# Patient Record
Sex: FEMALE | Race: WHITE
[De-identification: names, ages, dates, MRNs, and addresses within clinical notes are randomized per-mention and may not be internally consistent; named-entity substitution may affect disease eponyms.]

---

## 2018-03-26 ENCOUNTER — HOSPITAL ENCOUNTER (EMERGENCY)
Dept: HOSPITAL 17 - PHED | Age: 28
Discharge: HOME | End: 2018-03-26
Payer: SELF-PAY

## 2018-03-26 VITALS — TEMPERATURE: 98.9 F | HEART RATE: 93 BPM | OXYGEN SATURATION: 98 % | RESPIRATION RATE: 18 BRPM

## 2018-03-26 VITALS — HEART RATE: 89 BPM | RESPIRATION RATE: 16 BRPM | OXYGEN SATURATION: 98 %

## 2018-03-26 VITALS — HEIGHT: 63 IN | WEIGHT: 196.65 LBS | BODY MASS INDEX: 34.84 KG/M2

## 2018-03-26 VITALS
RESPIRATION RATE: 18 BRPM | DIASTOLIC BLOOD PRESSURE: 81 MMHG | OXYGEN SATURATION: 96 % | TEMPERATURE: 99.1 F | SYSTOLIC BLOOD PRESSURE: 140 MMHG | HEART RATE: 121 BPM

## 2018-03-26 DIAGNOSIS — J10.1: Primary | ICD-10-CM

## 2018-03-26 DIAGNOSIS — F17.200: ICD-10-CM

## 2018-03-26 LAB
ALBUMIN SERPL-MCNC: 3.3 GM/DL (ref 3.4–5)
ALP SERPL-CCNC: 65 U/L (ref 45–117)
ALT SERPL-CCNC: 50 U/L (ref 10–53)
AST SERPL-CCNC: 23 U/L (ref 15–37)
BASOPHILS # BLD AUTO: 0.2 TH/MM3 (ref 0–0.2)
BASOPHILS NFR BLD: 2.8 % (ref 0–2)
BILIRUB SERPL-MCNC: 0.7 MG/DL (ref 0.2–1)
BUN SERPL-MCNC: 18 MG/DL (ref 7–18)
CALCIUM SERPL-MCNC: 8.2 MG/DL (ref 8.5–10.1)
CHLORIDE SERPL-SCNC: 105 MEQ/L (ref 98–107)
CREAT SERPL-MCNC: 0.74 MG/DL (ref 0.5–1)
EOSINOPHIL # BLD: 0 TH/MM3 (ref 0–0.4)
EOSINOPHIL NFR BLD: 0.6 % (ref 0–4)
ERYTHROCYTE [DISTWIDTH] IN BLOOD BY AUTOMATED COUNT: 12.1 % (ref 11.6–17.2)
GFR SERPLBLD BASED ON 1.73 SQ M-ARVRAT: 96 ML/MIN (ref 89–?)
GLUCOSE SERPL-MCNC: 102 MG/DL (ref 74–106)
HCO3 BLD-SCNC: 24.8 MEQ/L (ref 21–32)
HCT VFR BLD CALC: 44 % (ref 35–46)
HGB BLD-MCNC: 14.7 GM/DL (ref 11.6–15.3)
LYMPHOCYTES # BLD AUTO: 0.6 TH/MM3 (ref 1–4.8)
LYMPHOCYTES NFR BLD AUTO: 8.6 % (ref 9–44)
MCH RBC QN AUTO: 28.7 PG (ref 27–34)
MCHC RBC AUTO-ENTMCNC: 33.5 % (ref 32–36)
MCV RBC AUTO: 85.8 FL (ref 80–100)
MONOCYTE #: 0.5 TH/MM3 (ref 0–0.9)
MONOCYTES NFR BLD: 7 % (ref 0–8)
NEUTROPHILS # BLD AUTO: 5.9 TH/MM3 (ref 1.8–7.7)
NEUTROPHILS NFR BLD AUTO: 81 % (ref 16–70)
PLATELET # BLD: 198 TH/MM3 (ref 150–450)
PMV BLD AUTO: 8.8 FL (ref 7–11)
PROT SERPL-MCNC: 6.9 GM/DL (ref 6.4–8.2)
RBC # BLD AUTO: 5.13 MIL/MM3 (ref 4–5.3)
SODIUM SERPL-SCNC: 139 MEQ/L (ref 136–145)
WBC # BLD AUTO: 7.2 TH/MM3 (ref 4–11)

## 2018-03-26 PROCEDURE — 96375 TX/PRO/DX INJ NEW DRUG ADDON: CPT

## 2018-03-26 PROCEDURE — 96361 HYDRATE IV INFUSION ADD-ON: CPT

## 2018-03-26 PROCEDURE — 96372 THER/PROPH/DIAG INJ SC/IM: CPT

## 2018-03-26 PROCEDURE — 99284 EMERGENCY DEPT VISIT MOD MDM: CPT

## 2018-03-26 PROCEDURE — 85025 COMPLETE CBC W/AUTO DIFF WBC: CPT

## 2018-03-26 PROCEDURE — 96374 THER/PROPH/DIAG INJ IV PUSH: CPT

## 2018-03-26 PROCEDURE — 87804 INFLUENZA ASSAY W/OPTIC: CPT

## 2018-03-26 PROCEDURE — 80053 COMPREHEN METABOLIC PANEL: CPT

## 2018-03-26 NOTE — PD
HPI


Chief Complaint:  GI Complaint


Time Seen by Provider:  01:02


Travel History


International Travel<30 days:  No


Contact w/Intl Traveler<30days:  No


Traveled to known affect area:  No





History of Present Illness


HPI


The patient is a 27-year-old female who presents to the emergency department 

for nausea, vomiting, diarrhea, and body aches.  The patient's symptoms started 

this morning with nausea and vomiting.  She has been unable to tolerate any 

oral intake today secondary to persistent nausea and vomiting.  She then 

developed diarrhea which she describes as loose, watery, brown, without any 

visible blood.  She also complains of generalized malaise and body aches.  She 

denies any sick contacts at home.  She does complain of subjective fevers at 

home but denies any chills or sweats.  She denies any associated dysuria, 

frequency, or urgency.  Last menstrual cycle was 2 weeks ago, she denies 

pregnancy.





PFSH


Past Medical History


Genitourinary:  Yes (UTI)


Pregnant?:  Not Pregnant


LMP:  03/12/18





Past Surgical History


Tonsillectomy:  Yes





Social History


Alcohol Use:  No


Tobacco Use:  Yes (1/2 PPD)


Substance Use:  No





Allergies-Medications


(Allergen,Severity, Reaction):  


Coded Allergies:  


     peanut (Verified  Allergy, Severe, Swelling, 3/26/18)


 "THROAT SWELLING AND HIVES"


Reported Meds & Prescriptions





Reported Meds & Active Scripts


Active


No Active Prescriptions or Reported Medications    








Review of Systems


Except as stated in HPI:  all other systems reviewed are Neg


General / Constitutional:  Positive: Fever (Subjective)


HENT:  No: Lightheadedness


Cardiovascular:  No: Chest Pain or Discomfort


Respiratory:  No: Shortness of Breath


Gastrointestinal:  Positive: Nausea, Vomiting, Diarrhea, Abdominal Pain (

Cramping)


Genitourinary:  No: Dysuria


Musculoskeletal:  Positive: Myalgias





Physical Exam


Narrative


GENERAL: Awake, alert, pleasant 27-year-old female who appears her stated age 

and is in no acute respiratory distress.


SKIN: Focused skin assessment warm/dry.


HEAD: Atraumatic. Normocephalic. 


EYES: No injection or drainage.


ENT: No nasal bleeding or discharge.  Slightly dry mucous membranes.


NECK: Trachea midline. No JVD. 


CARDIOVASCULAR: Regular, tachycardic with a heart rate 115.


RESPIRATORY: No accessory muscle use. Clear to auscultation. Breath sounds 

equal bilaterally. 


GASTROINTESTINAL: Abdomen soft, minimal epigastric tenderness.  No rebound 

tenderness, guarding, rigidity.


Back: No CVA tenderness.


MUSCULOSKELETAL: No obvious deformities. No clubbing.  No cyanosis.  No edema. 


NEUROLOGICAL: Awake and alert. No obvious cranial nerve deficits.  Motor 

grossly within normal limits. Normal speech.


PSYCHIATRIC: Appropriate mood and affect; insight and judgment normal.





Data


Data


Last Documented VS





Vital Signs








  Date Time  Temp Pulse Resp B/P (MAP) Pulse Ox O2 Delivery O2 Flow Rate FiO2


 


3/26/18 02:12 98.9 93 18  98 Room Air  








Orders





 Orders


Complete Blood Count With Diff (3/26/18 01:05)


Comprehensive Metabolic Panel (3/26/18 01:05)


Iv Access Insert/Monitor (3/26/18 01:05)


Ecg Monitoring (3/26/18 01:05)


Oximetry (3/26/18 01:05)


Ondansetron Inj (Zofran Inj) (3/26/18 01:15)


Sodium Chlor 0.9% 1000 Ml Inj (Ns 1000 M (3/26/18 01:05)


Sodium Chloride 0.9% Flush (Ns Flush) (3/26/18 01:15)


Famotidine Inj (Pepcid Inj) (3/26/18 01:15)


Dicyclomine Inj (Bentyl Inj) (3/26/18 01:15)


Influenzae A/B Antigen (3/26/18 01:15)





Labs





Laboratory Tests








Test


  3/26/18


01:15


 


White Blood Count 7.2 TH/MM3 


 


Red Blood Count 5.13 MIL/MM3 


 


Hemoglobin 14.7 GM/DL 


 


Hematocrit 44.0 % 


 


Mean Corpuscular Volume 85.8 FL 


 


Mean Corpuscular Hemoglobin 28.7 PG 


 


Mean Corpuscular Hemoglobin


Concent 33.5 % 


 


 


Red Cell Distribution Width 12.1 % 


 


Platelet Count 198 TH/MM3 


 


Mean Platelet Volume 8.8 FL 


 


Neutrophils (%) (Auto) 81.0 % 


 


Lymphocytes (%) (Auto) 8.6 % 


 


Monocytes (%) (Auto) 7.0 % 


 


Eosinophils (%) (Auto) 0.6 % 


 


Basophils (%) (Auto) 2.8 % 


 


Neutrophils # (Auto) 5.9 TH/MM3 


 


Lymphocytes # (Auto) 0.6 TH/MM3 


 


Monocytes # (Auto) 0.5 TH/MM3 


 


Eosinophils # (Auto) 0.0 TH/MM3 


 


Basophils # (Auto) 0.2 TH/MM3 


 


CBC Comment DIFF FINAL 


 


Differential Comment  


 


Blood Urea Nitrogen 18 MG/DL 


 


Creatinine 0.74 MG/DL 


 


Random Glucose 102 MG/DL 


 


Total Protein 6.9 GM/DL 


 


Albumin 3.3 GM/DL 


 


Calcium Level 8.2 MG/DL 


 


Alkaline Phosphatase 65 U/L 


 


Aspartate Amino Transf


(AST/SGOT) 23 U/L 


 


 


Alanine Aminotransferase


(ALT/SGPT) 50 U/L 


 


 


Total Bilirubin 0.7 MG/DL 


 


Sodium Level 139 MEQ/L 


 


Potassium Level 3.7 MEQ/L 


 


Chloride Level 105 MEQ/L 


 


Carbon Dioxide Level 24.8 MEQ/L 


 


Anion Gap 9 MEQ/L 


 


Estimat Glomerular Filtration


Rate 96 ML/MIN 


 











MDM


Medical Decision Making


Medical Screen Exam Complete:  Yes


Emergency Medical Condition:  Yes


Medical Record Reviewed:  Yes


Interpretation(s)





Laboratory Tests








Test


  3/26/18


01:15


 


White Blood Count 7.2 TH/MM3 


 


Red Blood Count 5.13 MIL/MM3 


 


Hemoglobin 14.7 GM/DL 


 


Hematocrit 44.0 % 


 


Mean Corpuscular Volume 85.8 FL 


 


Mean Corpuscular Hemoglobin 28.7 PG 


 


Mean Corpuscular Hemoglobin


Concent 33.5 % 


 


 


Red Cell Distribution Width 12.1 % 


 


Platelet Count 198 TH/MM3 


 


Mean Platelet Volume 8.8 FL 


 


Neutrophils (%) (Auto) 81.0 % 


 


Lymphocytes (%) (Auto) 8.6 % 


 


Monocytes (%) (Auto) 7.0 % 


 


Eosinophils (%) (Auto) 0.6 % 


 


Basophils (%) (Auto) 2.8 % 


 


Neutrophils # (Auto) 5.9 TH/MM3 


 


Lymphocytes # (Auto) 0.6 TH/MM3 


 


Monocytes # (Auto) 0.5 TH/MM3 


 


Eosinophils # (Auto) 0.0 TH/MM3 


 


Basophils # (Auto) 0.2 TH/MM3 


 


CBC Comment DIFF FINAL 


 


Differential Comment  


 


Blood Urea Nitrogen 18 MG/DL 


 


Creatinine 0.74 MG/DL 


 


Random Glucose 102 MG/DL 


 


Total Protein 6.9 GM/DL 


 


Albumin 3.3 GM/DL 


 


Calcium Level 8.2 MG/DL 


 


Alkaline Phosphatase 65 U/L 


 


Aspartate Amino Transf


(AST/SGOT) 23 U/L 


 


 


Alanine Aminotransferase


(ALT/SGPT) 50 U/L 


 


 


Total Bilirubin 0.7 MG/DL 


 


Sodium Level 139 MEQ/L 


 


Potassium Level 3.7 MEQ/L 


 


Chloride Level 105 MEQ/L 


 


Carbon Dioxide Level 24.8 MEQ/L 


 


Anion Gap 9 MEQ/L 


 


Estimat Glomerular Filtration


Rate 96 ML/MIN 


 














 Date/Time


Source Procedure


Growth Status


 


 


 3/26/18 01:10


Nasal Aspirate Influenza Types A,B Antigen ([facility]) - Final


Positive For Flu A Antigen Complete








Differential Diagnosis


Differential diagnosis includes influenza, viral syndrome, gastroenteritis, 

gastritis, pancreatitis, dehydration, electrolyte abnormality.


Narrative Course


IV was established, labs are drawn and sent, and the patient was placed on 

cardiac telemetry monitoring and continuous pulse oximetry monitoring.  The 

patient was administered Bentyl, Zofran, Pepcid, and IV fluids.  Influenza 

screen was sent to lab.  Labs are unremarkable except for influenza which is 

positive for influenza A.  The patient will be discharged home on Zofran, is 

advised to take Imodium as needed for diarrhea, plenty of fluids to stay 

hydrated, work excuse for 2 days.  Tamiflu as directed.  Zofran as needed.





Diagnosis





 Primary Impression:  


 Influenza A


Patient Instructions:  General Instructions





***Additional Instructions:  


Medications as directed.  Over-the-counter Imodium as needed for diarrhea.  

Plenty fluids to stay hydrated.  Work excuse for 2 days.  Please provide the 

patient a copy of her lab findings and flu results at discharge.


***Med/Other Pt SpecificInfo:  Prescription(s) given


Scripts


Ondansetron Odt (Zofran Odt) 4 Mg Tab


4 MG SL Q6HR Y for Nausea/Vomiting, #7 TAB 0 Refills


   Prov: Anirudh Joseph MD         3/26/18 


Oseltamivir (Tamiflu) 75 Mg Cap


75 MG PO BID for Mgmt Viral Infection for 5 Days, #10 CAP 0 Refills


   Prov: Anirudh Joseph MD         3/26/18


Disposition:  01 DISCHARGE HOME


Condition:  Stable











Anirudh Joseph MD Mar 26, 2018 01:09